# Patient Record
Sex: MALE | Race: WHITE
[De-identification: names, ages, dates, MRNs, and addresses within clinical notes are randomized per-mention and may not be internally consistent; named-entity substitution may affect disease eponyms.]

---
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
## 2023-01-18 ENCOUNTER — HOSPITAL ENCOUNTER (EMERGENCY)
Dept: HOSPITAL 26 - MED | Age: 18
Discharge: LEFT BEFORE DISCHARGE | End: 2023-01-18
Payer: MEDICAID

## 2023-01-18 VITALS — HEIGHT: 73 IN | BODY MASS INDEX: 19.35 KG/M2 | WEIGHT: 146 LBS

## 2023-01-18 VITALS — SYSTOLIC BLOOD PRESSURE: 116 MMHG | DIASTOLIC BLOOD PRESSURE: 70 MMHG

## 2023-01-18 DIAGNOSIS — R06.02: Primary | ICD-10-CM

## 2023-01-18 DIAGNOSIS — Z20.822: ICD-10-CM

## 2023-01-18 DIAGNOSIS — Z53.21: ICD-10-CM

## 2023-01-18 NOTE — NUR
LAST ATTEMPT TO BRING PT BACK, NOT FOUND IN LOBBY/OUTSIDE. PATIENT LEFT WITHOUT 
BEING SEEN BY DR. GONZALEZ. NO FURTHER CARE PROVIDED FOR PATIENT.